# Patient Record
Sex: FEMALE | Race: WHITE | NOT HISPANIC OR LATINO | Employment: STUDENT | URBAN - METROPOLITAN AREA
[De-identification: names, ages, dates, MRNs, and addresses within clinical notes are randomized per-mention and may not be internally consistent; named-entity substitution may affect disease eponyms.]

---

## 2021-01-08 ENCOUNTER — APPOINTMENT (OUTPATIENT)
Dept: CT IMAGING | Facility: HOSPITAL | Age: 21
End: 2021-01-08

## 2021-01-08 ENCOUNTER — TRANSCRIBE ORDERS (OUTPATIENT)
Dept: ADMINISTRATIVE | Facility: HOSPITAL | Age: 21
End: 2021-01-08

## 2021-01-08 ENCOUNTER — ANESTHESIA (OUTPATIENT)
Dept: PERIOP | Facility: HOSPITAL | Age: 21
End: 2021-01-08

## 2021-01-08 ENCOUNTER — HOSPITAL ENCOUNTER (OUTPATIENT)
Facility: HOSPITAL | Age: 21
Discharge: HOME | End: 2021-01-09
Attending: EMERGENCY MEDICINE | Admitting: SURGERY

## 2021-01-08 ENCOUNTER — ANESTHESIA EVENT (OUTPATIENT)
Dept: PERIOP | Facility: HOSPITAL | Age: 21
End: 2021-01-08

## 2021-01-08 DIAGNOSIS — K35.80 ACUTE APPENDICITIS: ICD-10-CM

## 2021-01-08 DIAGNOSIS — K35.30 ACUTE APPENDICITIS WITH LOCALIZED PERITONITIS, WITHOUT PERFORATION, ABSCESS, OR GANGRENE: Primary | ICD-10-CM

## 2021-01-08 DIAGNOSIS — R10.31 ABDOMINAL PAIN, RIGHT LOWER QUADRANT: Primary | ICD-10-CM

## 2021-01-08 LAB
ALBUMIN SERPL-MCNC: 4.4 G/DL (ref 3.5–5.2)
ALBUMIN/GLOB SERPL: 1.4 G/DL
ALP SERPL-CCNC: 64 U/L (ref 39–117)
ALT SERPL W P-5'-P-CCNC: 12 U/L (ref 1–33)
ANION GAP SERPL CALCULATED.3IONS-SCNC: 12 MMOL/L (ref 5–15)
AST SERPL-CCNC: 17 U/L (ref 1–32)
B-HCG UR QL: NEGATIVE
BACTERIA UR QL AUTO: ABNORMAL /HPF
BASOPHILS # BLD AUTO: 0.04 10*3/MM3 (ref 0–0.2)
BASOPHILS NFR BLD AUTO: 0.4 % (ref 0–1.5)
BILIRUB SERPL-MCNC: 0.5 MG/DL (ref 0–1.2)
BILIRUB UR QL STRIP: NEGATIVE
BUN SERPL-MCNC: 5 MG/DL (ref 6–20)
BUN/CREAT SERPL: 5.8 (ref 7–25)
CALCIUM SPEC-SCNC: 9.2 MG/DL (ref 8.6–10.5)
CHLORIDE SERPL-SCNC: 100 MMOL/L (ref 98–107)
CLARITY UR: CLEAR
CO2 SERPL-SCNC: 24 MMOL/L (ref 22–29)
COLOR UR: YELLOW
CREAT SERPL-MCNC: 0.86 MG/DL (ref 0.57–1)
DEPRECATED RDW RBC AUTO: 41.1 FL (ref 37–54)
EOSINOPHIL # BLD AUTO: 0.06 10*3/MM3 (ref 0–0.4)
EOSINOPHIL NFR BLD AUTO: 0.5 % (ref 0.3–6.2)
ERYTHROCYTE [DISTWIDTH] IN BLOOD BY AUTOMATED COUNT: 12.5 % (ref 12.3–15.4)
FLUAV RNA RESP QL NAA+PROBE: NOT DETECTED
FLUBV RNA RESP QL NAA+PROBE: NOT DETECTED
GFR SERPL CREATININE-BSD FRML MDRD: 83 ML/MIN/1.73
GLOBULIN UR ELPH-MCNC: 3.2 GM/DL
GLUCOSE SERPL-MCNC: 98 MG/DL (ref 65–99)
GLUCOSE UR STRIP-MCNC: NEGATIVE MG/DL
HCT VFR BLD AUTO: 35.7 % (ref 34–46.6)
HGB BLD-MCNC: 11.8 G/DL (ref 12–15.9)
HGB UR QL STRIP.AUTO: ABNORMAL
HOLD SPECIMEN: NORMAL
HOLD SPECIMEN: NORMAL
HYALINE CASTS UR QL AUTO: ABNORMAL /LPF
IMM GRANULOCYTES # BLD AUTO: 0.03 10*3/MM3 (ref 0–0.05)
IMM GRANULOCYTES NFR BLD AUTO: 0.3 % (ref 0–0.5)
KETONES UR QL STRIP: ABNORMAL
LEUKOCYTE ESTERASE UR QL STRIP.AUTO: ABNORMAL
LIPASE SERPL-CCNC: 16 U/L (ref 13–60)
LYMPHOCYTES # BLD AUTO: 1.65 10*3/MM3 (ref 0.7–3.1)
LYMPHOCYTES NFR BLD AUTO: 14.7 % (ref 19.6–45.3)
MCH RBC QN AUTO: 30.2 PG (ref 26.6–33)
MCHC RBC AUTO-ENTMCNC: 33.1 G/DL (ref 31.5–35.7)
MCV RBC AUTO: 91.3 FL (ref 79–97)
MONOCYTES # BLD AUTO: 1.1 10*3/MM3 (ref 0.1–0.9)
MONOCYTES NFR BLD AUTO: 9.8 % (ref 5–12)
NEUTROPHILS NFR BLD AUTO: 74.3 % (ref 42.7–76)
NEUTROPHILS NFR BLD AUTO: 8.34 10*3/MM3 (ref 1.7–7)
NITRITE UR QL STRIP: NEGATIVE
NRBC BLD AUTO-RTO: 0 /100 WBC (ref 0–0.2)
PH UR STRIP.AUTO: 6 [PH] (ref 5–8)
PLATELET # BLD AUTO: 244 10*3/MM3 (ref 140–450)
PMV BLD AUTO: 10.6 FL (ref 6–12)
POTASSIUM SERPL-SCNC: 3.9 MMOL/L (ref 3.5–5.2)
PROT SERPL-MCNC: 7.6 G/DL (ref 6–8.5)
PROT UR QL STRIP: NEGATIVE
RBC # BLD AUTO: 3.91 10*6/MM3 (ref 3.77–5.28)
RBC # UR: ABNORMAL /HPF
REF LAB TEST METHOD: ABNORMAL
SARS-COV-2 RNA RESP QL NAA+PROBE: NOT DETECTED
SODIUM SERPL-SCNC: 136 MMOL/L (ref 136–145)
SP GR UR STRIP: 1.02 (ref 1–1.03)
SQUAMOUS #/AREA URNS HPF: ABNORMAL /HPF
UROBILINOGEN UR QL STRIP: ABNORMAL
WBC # BLD AUTO: 11.22 10*3/MM3 (ref 3.4–10.8)
WBC UR QL AUTO: ABNORMAL /HPF
WHOLE BLOOD HOLD SPECIMEN: NORMAL
WHOLE BLOOD HOLD SPECIMEN: NORMAL

## 2021-01-08 PROCEDURE — 85025 COMPLETE CBC W/AUTO DIFF WBC: CPT

## 2021-01-08 PROCEDURE — 96375 TX/PRO/DX INJ NEW DRUG ADDON: CPT

## 2021-01-08 PROCEDURE — 83690 ASSAY OF LIPASE: CPT

## 2021-01-08 PROCEDURE — 99283 EMERGENCY DEPT VISIT LOW MDM: CPT

## 2021-01-08 PROCEDURE — 88304 TISSUE EXAM BY PATHOLOGIST: CPT | Performed by: SURGERY

## 2021-01-08 PROCEDURE — 25010000002 IOPAMIDOL 61 % SOLUTION: Performed by: EMERGENCY MEDICINE

## 2021-01-08 PROCEDURE — 87636 SARSCOV2 & INF A&B AMP PRB: CPT | Performed by: PHYSICIAN ASSISTANT

## 2021-01-08 PROCEDURE — 74177 CT ABD & PELVIS W/CONTRAST: CPT

## 2021-01-08 PROCEDURE — 81001 URINALYSIS AUTO W/SCOPE: CPT

## 2021-01-08 PROCEDURE — 25010000002 NEOSTIGMINE 10 MG/10ML SOLUTION: Performed by: ANESTHESIOLOGY

## 2021-01-08 PROCEDURE — 25010000002 FENTANYL CITRATE (PF) 100 MCG/2ML SOLUTION: Performed by: ANESTHESIOLOGY

## 2021-01-08 PROCEDURE — G0378 HOSPITAL OBSERVATION PER HR: HCPCS

## 2021-01-08 PROCEDURE — 25010000002 KETOROLAC TROMETHAMINE PER 15 MG: Performed by: PHYSICIAN ASSISTANT

## 2021-01-08 PROCEDURE — 25010000002 DEXAMETHASONE PER 1 MG: Performed by: ANESTHESIOLOGY

## 2021-01-08 PROCEDURE — 25010000002 PIPERACILLIN SOD-TAZOBACTAM PER 1 G: Performed by: PHYSICIAN ASSISTANT

## 2021-01-08 PROCEDURE — 80053 COMPREHEN METABOLIC PANEL: CPT

## 2021-01-08 PROCEDURE — 96365 THER/PROPH/DIAG IV INF INIT: CPT

## 2021-01-08 PROCEDURE — 25010000002 PROPOFOL 10 MG/ML EMULSION: Performed by: ANESTHESIOLOGY

## 2021-01-08 PROCEDURE — 81025 URINE PREGNANCY TEST: CPT | Performed by: EMERGENCY MEDICINE

## 2021-01-08 PROCEDURE — 25010000002 ONDANSETRON PER 1 MG: Performed by: ANESTHESIOLOGY

## 2021-01-08 PROCEDURE — 99284 EMERGENCY DEPT VISIT MOD MDM: CPT

## 2021-01-08 PROCEDURE — 25010000002 ONDANSETRON PER 1 MG: Performed by: PHYSICIAN ASSISTANT

## 2021-01-08 PROCEDURE — C9803 HOPD COVID-19 SPEC COLLECT: HCPCS

## 2021-01-08 DEVICE — ENDOPATH ECHELON VASCULAR  RELOADS, WHITE, 35MM
Type: IMPLANTABLE DEVICE | Site: CECUM | Status: FUNCTIONAL
Brand: ECHELON ENDOPATH

## 2021-01-08 DEVICE — LIGACLIP 10-M/L, 10MM ENDOSCOPIC ROTATING MULTIPLE CLIP APPLIERS
Type: IMPLANTABLE DEVICE | Site: CECUM | Status: FUNCTIONAL
Brand: LIGACLIP

## 2021-01-08 RX ORDER — SODIUM CHLORIDE, SODIUM LACTATE, POTASSIUM CHLORIDE, CALCIUM CHLORIDE 600; 310; 30; 20 MG/100ML; MG/100ML; MG/100ML; MG/100ML
INJECTION, SOLUTION INTRAVENOUS CONTINUOUS PRN
Status: DISCONTINUED | OUTPATIENT
Start: 2021-01-08 | End: 2021-01-09 | Stop reason: SURG

## 2021-01-08 RX ORDER — ESCITALOPRAM OXALATE 20 MG/1
20 TABLET ORAL DAILY
COMMUNITY

## 2021-01-08 RX ORDER — LIDOCAINE HYDROCHLORIDE 20 MG/ML
INJECTION, SOLUTION INFILTRATION; PERINEURAL AS NEEDED
Status: DISCONTINUED | OUTPATIENT
Start: 2021-01-08 | End: 2021-01-09 | Stop reason: SURG

## 2021-01-08 RX ORDER — DROPERIDOL 2.5 MG/ML
0.62 INJECTION, SOLUTION INTRAMUSCULAR; INTRAVENOUS ONCE AS NEEDED
Status: DISCONTINUED | OUTPATIENT
Start: 2021-01-08 | End: 2021-01-09 | Stop reason: HOSPADM

## 2021-01-08 RX ORDER — PROPOFOL 10 MG/ML
VIAL (ML) INTRAVENOUS AS NEEDED
Status: DISCONTINUED | OUTPATIENT
Start: 2021-01-08 | End: 2021-01-09 | Stop reason: SURG

## 2021-01-08 RX ORDER — FENTANYL CITRATE 50 UG/ML
INJECTION, SOLUTION INTRAMUSCULAR; INTRAVENOUS AS NEEDED
Status: DISCONTINUED | OUTPATIENT
Start: 2021-01-08 | End: 2021-01-09 | Stop reason: SURG

## 2021-01-08 RX ORDER — TRAZODONE HYDROCHLORIDE 50 MG/1
50 TABLET ORAL NIGHTLY
COMMUNITY

## 2021-01-08 RX ORDER — BUPIVACAINE HYDROCHLORIDE 2.5 MG/ML
INJECTION, SOLUTION EPIDURAL; INFILTRATION; INTRACAUDAL AS NEEDED
Status: DISCONTINUED | OUTPATIENT
Start: 2021-01-08 | End: 2021-01-09 | Stop reason: HOSPADM

## 2021-01-08 RX ORDER — NEOSTIGMINE METHYLSULFATE 1 MG/ML
INJECTION, SOLUTION INTRAVENOUS AS NEEDED
Status: DISCONTINUED | OUTPATIENT
Start: 2021-01-08 | End: 2021-01-09 | Stop reason: SURG

## 2021-01-08 RX ORDER — FENTANYL CITRATE 50 UG/ML
50 INJECTION, SOLUTION INTRAMUSCULAR; INTRAVENOUS
Status: DISCONTINUED | OUTPATIENT
Start: 2021-01-08 | End: 2021-01-09 | Stop reason: HOSPADM

## 2021-01-08 RX ORDER — GLYCOPYRROLATE 0.2 MG/ML
INJECTION INTRAMUSCULAR; INTRAVENOUS AS NEEDED
Status: DISCONTINUED | OUTPATIENT
Start: 2021-01-08 | End: 2021-01-09 | Stop reason: SURG

## 2021-01-08 RX ORDER — ONDANSETRON 2 MG/ML
4 INJECTION INTRAMUSCULAR; INTRAVENOUS ONCE AS NEEDED
Status: COMPLETED | OUTPATIENT
Start: 2021-01-08 | End: 2021-01-09

## 2021-01-08 RX ORDER — DEXAMETHASONE SODIUM PHOSPHATE 10 MG/ML
INJECTION INTRAMUSCULAR; INTRAVENOUS AS NEEDED
Status: DISCONTINUED | OUTPATIENT
Start: 2021-01-08 | End: 2021-01-09 | Stop reason: SURG

## 2021-01-08 RX ORDER — ONDANSETRON 2 MG/ML
4 INJECTION INTRAMUSCULAR; INTRAVENOUS ONCE
Status: COMPLETED | OUTPATIENT
Start: 2021-01-08 | End: 2021-01-08

## 2021-01-08 RX ORDER — ROCURONIUM BROMIDE 10 MG/ML
INJECTION, SOLUTION INTRAVENOUS AS NEEDED
Status: DISCONTINUED | OUTPATIENT
Start: 2021-01-08 | End: 2021-01-09 | Stop reason: SURG

## 2021-01-08 RX ORDER — KETOROLAC TROMETHAMINE 15 MG/ML
15 INJECTION, SOLUTION INTRAMUSCULAR; INTRAVENOUS ONCE
Status: COMPLETED | OUTPATIENT
Start: 2021-01-08 | End: 2021-01-08

## 2021-01-08 RX ORDER — SODIUM CHLORIDE 9 MG/ML
10 INJECTION INTRAVENOUS AS NEEDED
Status: DISCONTINUED | OUTPATIENT
Start: 2021-01-08 | End: 2021-01-09

## 2021-01-08 RX ORDER — MAGNESIUM HYDROXIDE 1200 MG/15ML
LIQUID ORAL AS NEEDED
Status: DISCONTINUED | OUTPATIENT
Start: 2021-01-08 | End: 2021-01-09 | Stop reason: HOSPADM

## 2021-01-08 RX ORDER — ONDANSETRON 2 MG/ML
INJECTION INTRAMUSCULAR; INTRAVENOUS AS NEEDED
Status: DISCONTINUED | OUTPATIENT
Start: 2021-01-08 | End: 2021-01-09 | Stop reason: SURG

## 2021-01-08 RX ORDER — SODIUM CHLORIDE 9 MG/ML
INJECTION, SOLUTION INTRAVENOUS AS NEEDED
Status: DISCONTINUED | OUTPATIENT
Start: 2021-01-08 | End: 2021-01-09 | Stop reason: HOSPADM

## 2021-01-08 RX ADMIN — KETOROLAC TROMETHAMINE 15 MG: 15 INJECTION, SOLUTION INTRAMUSCULAR; INTRAVENOUS at 20:46

## 2021-01-08 RX ADMIN — NEOSTIGMINE 2 MG: 1 INJECTION INTRAVENOUS at 23:50

## 2021-01-08 RX ADMIN — FAMOTIDINE 20 MG: 10 INJECTION, SOLUTION INTRAVENOUS at 22:38

## 2021-01-08 RX ADMIN — DEXAMETHASONE SODIUM PHOSPHATE 8 MG: 10 INJECTION INTRAMUSCULAR; INTRAVENOUS at 23:04

## 2021-01-08 RX ADMIN — PROPOFOL 200 MG: 10 INJECTION, EMULSION INTRAVENOUS at 22:52

## 2021-01-08 RX ADMIN — IOPAMIDOL 80 ML: 612 INJECTION, SOLUTION INTRAVENOUS at 21:20

## 2021-01-08 RX ADMIN — TAZOBACTAM SODIUM AND PIPERACILLIN SODIUM 3.38 G: 375; 3 INJECTION, SOLUTION INTRAVENOUS at 21:47

## 2021-01-08 RX ADMIN — LIDOCAINE HYDROCHLORIDE 100 MG: 20 INJECTION, SOLUTION INFILTRATION; PERINEURAL at 22:52

## 2021-01-08 RX ADMIN — SODIUM CHLORIDE, POTASSIUM CHLORIDE, SODIUM LACTATE AND CALCIUM CHLORIDE: 600; 310; 30; 20 INJECTION, SOLUTION INTRAVENOUS at 22:52

## 2021-01-08 RX ADMIN — SODIUM CHLORIDE 1000 ML: 9 INJECTION, SOLUTION INTRAVENOUS at 20:43

## 2021-01-08 RX ADMIN — ONDANSETRON 4 MG: 2 INJECTION INTRAMUSCULAR; INTRAVENOUS at 23:50

## 2021-01-08 RX ADMIN — ROCURONIUM BROMIDE 35 MG: 10 INJECTION INTRAVENOUS at 22:52

## 2021-01-08 RX ADMIN — FENTANYL CITRATE 50 MCG: 50 INJECTION, SOLUTION INTRAMUSCULAR; INTRAVENOUS at 23:52

## 2021-01-08 RX ADMIN — GLYCOPYRROLATE 0.4 MG: 0.4 INJECTION INTRAMUSCULAR; INTRAVENOUS at 23:50

## 2021-01-08 RX ADMIN — FENTANYL CITRATE 50 MCG: 50 INJECTION, SOLUTION INTRAMUSCULAR; INTRAVENOUS at 22:52

## 2021-01-08 RX ADMIN — ONDANSETRON 4 MG: 2 INJECTION INTRAMUSCULAR; INTRAVENOUS at 20:42

## 2021-01-09 VITALS
OXYGEN SATURATION: 98 % | BODY MASS INDEX: 27.28 KG/M2 | RESPIRATION RATE: 18 BRPM | HEART RATE: 63 BPM | TEMPERATURE: 98.2 F | DIASTOLIC BLOOD PRESSURE: 58 MMHG | HEIGHT: 68 IN | WEIGHT: 180 LBS | SYSTOLIC BLOOD PRESSURE: 108 MMHG

## 2021-01-09 LAB
ANION GAP SERPL CALCULATED.3IONS-SCNC: 9 MMOL/L (ref 5–15)
BASOPHILS # BLD AUTO: 0.01 10*3/MM3 (ref 0–0.2)
BASOPHILS NFR BLD AUTO: 0.1 % (ref 0–1.5)
BUN SERPL-MCNC: 4 MG/DL (ref 6–20)
BUN/CREAT SERPL: 6 (ref 7–25)
CALCIUM SPEC-SCNC: 8.7 MG/DL (ref 8.6–10.5)
CHLORIDE SERPL-SCNC: 106 MMOL/L (ref 98–107)
CO2 SERPL-SCNC: 21 MMOL/L (ref 22–29)
CREAT SERPL-MCNC: 0.67 MG/DL (ref 0.57–1)
DEPRECATED RDW RBC AUTO: 41.9 FL (ref 37–54)
EOSINOPHIL # BLD AUTO: 0 10*3/MM3 (ref 0–0.4)
EOSINOPHIL NFR BLD AUTO: 0 % (ref 0.3–6.2)
ERYTHROCYTE [DISTWIDTH] IN BLOOD BY AUTOMATED COUNT: 12.7 % (ref 12.3–15.4)
GFR SERPL CREATININE-BSD FRML MDRD: 110 ML/MIN/1.73
GLUCOSE SERPL-MCNC: 122 MG/DL (ref 65–99)
HCT VFR BLD AUTO: 31.7 % (ref 34–46.6)
HGB BLD-MCNC: 9.9 G/DL (ref 12–15.9)
IMM GRANULOCYTES # BLD AUTO: 0.02 10*3/MM3 (ref 0–0.05)
IMM GRANULOCYTES NFR BLD AUTO: 0.3 % (ref 0–0.5)
LYMPHOCYTES # BLD AUTO: 0.78 10*3/MM3 (ref 0.7–3.1)
LYMPHOCYTES NFR BLD AUTO: 11.4 % (ref 19.6–45.3)
MCH RBC QN AUTO: 28.7 PG (ref 26.6–33)
MCHC RBC AUTO-ENTMCNC: 31.2 G/DL (ref 31.5–35.7)
MCV RBC AUTO: 91.9 FL (ref 79–97)
MONOCYTES # BLD AUTO: 0.63 10*3/MM3 (ref 0.1–0.9)
MONOCYTES NFR BLD AUTO: 9.2 % (ref 5–12)
NEUTROPHILS NFR BLD AUTO: 5.42 10*3/MM3 (ref 1.7–7)
NEUTROPHILS NFR BLD AUTO: 79 % (ref 42.7–76)
NRBC BLD AUTO-RTO: 0 /100 WBC (ref 0–0.2)
PLAT MORPH BLD: NORMAL
PLATELET # BLD AUTO: 218 10*3/MM3 (ref 140–450)
PMV BLD AUTO: 11 FL (ref 6–12)
POTASSIUM SERPL-SCNC: 4.4 MMOL/L (ref 3.5–5.2)
RBC # BLD AUTO: 3.45 10*6/MM3 (ref 3.77–5.28)
RBC MORPH BLD: NORMAL
SODIUM SERPL-SCNC: 136 MMOL/L (ref 136–145)
WBC # BLD AUTO: 6.86 10*3/MM3 (ref 3.4–10.8)
WBC MORPH BLD: NORMAL

## 2021-01-09 PROCEDURE — 80048 BASIC METABOLIC PNL TOTAL CA: CPT | Performed by: SURGERY

## 2021-01-09 PROCEDURE — 25010000002 FENTANYL CITRATE (PF) 100 MCG/2ML SOLUTION: Performed by: ANESTHESIOLOGY

## 2021-01-09 PROCEDURE — G0378 HOSPITAL OBSERVATION PER HR: HCPCS

## 2021-01-09 PROCEDURE — 85025 COMPLETE CBC W/AUTO DIFF WBC: CPT | Performed by: SURGERY

## 2021-01-09 PROCEDURE — 25010000002 ONDANSETRON PER 1 MG: Performed by: ANESTHESIOLOGY

## 2021-01-09 PROCEDURE — 25010000002 ONDANSETRON PER 1 MG: Performed by: SURGERY

## 2021-01-09 PROCEDURE — 85007 BL SMEAR W/DIFF WBC COUNT: CPT | Performed by: SURGERY

## 2021-01-09 PROCEDURE — 25010000002 PIPERACILLIN SOD-TAZOBACTAM PER 1 G: Performed by: SURGERY

## 2021-01-09 RX ORDER — DEXTROSE AND SODIUM CHLORIDE 5; .45 G/100ML; G/100ML
100 INJECTION, SOLUTION INTRAVENOUS CONTINUOUS
Status: DISCONTINUED | OUTPATIENT
Start: 2021-01-09 | End: 2021-01-09

## 2021-01-09 RX ORDER — FAMOTIDINE 20 MG/1
20 TABLET, FILM COATED ORAL 2 TIMES DAILY
Status: DISCONTINUED | OUTPATIENT
Start: 2021-01-09 | End: 2021-01-09 | Stop reason: HOSPADM

## 2021-01-09 RX ORDER — ONDANSETRON 2 MG/ML
4 INJECTION INTRAMUSCULAR; INTRAVENOUS EVERY 6 HOURS PRN
Status: DISCONTINUED | OUTPATIENT
Start: 2021-01-09 | End: 2021-01-09 | Stop reason: HOSPADM

## 2021-01-09 RX ORDER — ONDANSETRON 4 MG/1
4 TABLET, FILM COATED ORAL EVERY 6 HOURS PRN
Status: DISCONTINUED | OUTPATIENT
Start: 2021-01-09 | End: 2021-01-09 | Stop reason: HOSPADM

## 2021-01-09 RX ORDER — OXYCODONE HYDROCHLORIDE AND ACETAMINOPHEN 5; 325 MG/1; MG/1
1 TABLET ORAL EVERY 4 HOURS PRN
Status: DISCONTINUED | OUTPATIENT
Start: 2021-01-09 | End: 2021-01-09 | Stop reason: HOSPADM

## 2021-01-09 RX ORDER — DOCUSATE SODIUM 100 MG/1
100 CAPSULE, LIQUID FILLED ORAL 2 TIMES DAILY PRN
Status: DISCONTINUED | OUTPATIENT
Start: 2021-01-09 | End: 2021-01-09 | Stop reason: HOSPADM

## 2021-01-09 RX ORDER — ESCITALOPRAM OXALATE 20 MG/1
20 TABLET ORAL DAILY
Status: DISCONTINUED | OUTPATIENT
Start: 2021-01-09 | End: 2021-01-09 | Stop reason: HOSPADM

## 2021-01-09 RX ORDER — PSEUDOEPHEDRINE HCL 30 MG
100 TABLET ORAL 2 TIMES DAILY PRN
Qty: 30 CAPSULE | Refills: 1 | Status: SHIPPED | OUTPATIENT
Start: 2021-01-09

## 2021-01-09 RX ORDER — NALOXONE HCL 0.4 MG/ML
0.1 VIAL (ML) INJECTION
Status: DISCONTINUED | OUTPATIENT
Start: 2021-01-09 | End: 2021-01-09 | Stop reason: HOSPADM

## 2021-01-09 RX ORDER — HYDROMORPHONE HCL 110MG/55ML
0.5 PATIENT CONTROLLED ANALGESIA SYRINGE INTRAVENOUS
Status: DISCONTINUED | OUTPATIENT
Start: 2021-01-09 | End: 2021-01-09 | Stop reason: HOSPADM

## 2021-01-09 RX ORDER — OXYCODONE HYDROCHLORIDE AND ACETAMINOPHEN 5; 325 MG/1; MG/1
1 TABLET ORAL EVERY 4 HOURS PRN
Qty: 30 TABLET | Refills: 0 | Status: SHIPPED | OUTPATIENT
Start: 2021-01-09 | End: 2021-01-19

## 2021-01-09 RX ORDER — TRAZODONE HYDROCHLORIDE 50 MG/1
50 TABLET ORAL NIGHTLY
Status: DISCONTINUED | OUTPATIENT
Start: 2021-01-09 | End: 2021-01-09 | Stop reason: HOSPADM

## 2021-01-09 RX ADMIN — FAMOTIDINE 20 MG: 20 TABLET, FILM COATED ORAL at 08:22

## 2021-01-09 RX ADMIN — OXYCODONE HYDROCHLORIDE AND ACETAMINOPHEN 1 TABLET: 5; 325 TABLET ORAL at 09:09

## 2021-01-09 RX ADMIN — OXYCODONE HYDROCHLORIDE AND ACETAMINOPHEN 1 TABLET: 5; 325 TABLET ORAL at 14:34

## 2021-01-09 RX ADMIN — ONDANSETRON 4 MG: 2 INJECTION INTRAMUSCULAR; INTRAVENOUS at 01:12

## 2021-01-09 RX ADMIN — FAMOTIDINE 20 MG: 20 TABLET, FILM COATED ORAL at 01:05

## 2021-01-09 RX ADMIN — DEXTROSE AND SODIUM CHLORIDE 100 ML/HR: 5; 450 INJECTION, SOLUTION INTRAVENOUS at 01:05

## 2021-01-09 RX ADMIN — TAZOBACTAM SODIUM AND PIPERACILLIN SODIUM 3.38 G: 375; 3 INJECTION, SOLUTION INTRAVENOUS at 05:19

## 2021-01-09 RX ADMIN — TRAZODONE HYDROCHLORIDE 50 MG: 50 TABLET ORAL at 01:05

## 2021-01-09 RX ADMIN — OXYCODONE HYDROCHLORIDE AND ACETAMINOPHEN 1 TABLET: 5; 325 TABLET ORAL at 02:57

## 2021-01-09 RX ADMIN — FENTANYL CITRATE 50 MCG: 0.05 INJECTION, SOLUTION INTRAMUSCULAR; INTRAVENOUS at 00:15

## 2021-01-09 RX ADMIN — ESCITALOPRAM OXALATE 20 MG: 20 TABLET ORAL at 08:22

## 2021-01-09 RX ADMIN — ONDANSETRON 4 MG: 2 INJECTION INTRAMUSCULAR; INTRAVENOUS at 00:15

## 2021-01-09 NOTE — ANESTHESIA PROCEDURE NOTES
Airway  Urgency: elective    Date/Time: 1/8/2021 10:54 PM  Airway not difficult    General Information and Staff    Patient location during procedure: OR    Indications and Patient Condition  Indications for airway management: airway protection    Preoxygenated: yes  MILS not maintained throughout  Mask difficulty assessment: 1 - vent by mask    Final Airway Details  Final airway type: endotracheal airway      Successful airway: ETT  Cuffed: yes   Successful intubation technique: direct laryngoscopy  Endotracheal tube insertion site: oral  Blade: Gustavo  Blade size: 3  ETT size (mm): 7.0  Cormack-Lehane Classification: grade I - full view of glottis  Placement verified by: chest auscultation and capnometry   Measured from: lips  ETT/EBT  to lips (cm): 20  Number of attempts at approach: 1  Assessment: lips, teeth, and gum same as pre-op and atraumatic intubation    Additional Comments  Negative epigastric sounds, Breath sound equal bilaterally with symmetric chest rise and fall

## 2021-01-09 NOTE — ED PROVIDER NOTES
" EMERGENCY DEPARTMENT ENCOUNTER    Room Number:  01/01  Date of encounter:  1/8/2021  PCP: Valeria Patel APRN  Historian: Patient      HPI:  Chief Complaint: Right lower quadrant pain since last night nausea vomiting and diarrhea        Context: Alethea Mandujano is a 20 y.o. female who presents to the ED c/o onset yesterday evening of right sided lower quadrant abdominal pain she describes as her stomach being \"on fire\".  This has been accompanied by persistent nausea, vomiting x3, and diarrhea x3 today.  She describes generalized myalgias and arthralgias and general malaise and fatigue.  Pain today has settled in her right lower quadrant.  Last meal was a few crackers this morning.  No stiff neck vision changes or photophobia.  No headache vision or vision changes.  She denies cough or coryza symptoms.  No shortness of breath back pain or hemoptysis.  No hematochezia or melena.  No dysuria hematuria pyuria.  No flank pain.  No vaginal discharge or bleeding.  Last menstrual period was 12/23, denies possibility of pregnancy.      PAST MEDICAL HISTORY  Active Ambulatory Problems     Diagnosis Date Noted   • No Active Ambulatory Problems     Resolved Ambulatory Problems     Diagnosis Date Noted   • No Resolved Ambulatory Problems     No Additional Past Medical History         PAST SURGICAL HISTORY  Past Surgical History:   Procedure Laterality Date   • WISDOM TOOTH EXTRACTION           FAMILY HISTORY  History reviewed. No pertinent family history.      SOCIAL HISTORY  Social History     Socioeconomic History   • Marital status: Single     Spouse name: Not on file   • Number of children: Not on file   • Years of education: Not on file   • Highest education level: Not on file   Tobacco Use   • Smoking status: Never Smoker   Substance and Sexual Activity   • Alcohol use: Not Currently   • Drug use: Never         ALLERGIES  Patient has no known allergies.        REVIEW OF SYSTEMS  Review of Systems   Constitutional: " Positive for activity change, appetite change, chills and fatigue.   HENT: Negative for congestion, rhinorrhea and sore throat.    Respiratory: Negative for cough, chest tightness and shortness of breath.    Cardiovascular: Negative for chest pain, palpitations and leg swelling.   Gastrointestinal: Positive for abdominal pain and diarrhea. Negative for nausea and vomiting.   Genitourinary: Negative for dysuria, flank pain, hematuria, urgency, vaginal bleeding and vaginal discharge.   Musculoskeletal: Positive for arthralgias and myalgias. Negative for back pain, neck pain and neck stiffness.   Neurological: Negative for seizures, syncope and headaches.        All systems reviewed and negative except for those discussed in HPI.       PHYSICAL EXAM    I have reviewed the triage vital signs and nursing notes.    ED Triage Vitals [01/08/21 1718]   Temp Heart Rate Resp BP SpO2   97.3 °F (36.3 °C) 98 20 133/99 100 %      Temp src Heart Rate Source Patient Position BP Location FiO2 (%)   Tympanic Monitor Sitting Left arm --       Physical Exam  GENERAL:   Hemodynamically stable and afebrile, not toxic appearing, not in acute distress.  HENT: Nares patent.  EYES: No scleral icterus.  CV: Regular rhythm, regular rate.  RESPIRATORY: Normal effort.  No audible wheezes, rales or rhonchi.  ABDOMEN: Soft, with moderate tenderness at McBurney's point, reproducing patient's symptoms.  She does have mild rebound, and Rovsing's is ambiguous.  Bowel sounds are normal.  No CVA or suprapubic tenderness.  MUSCULOSKELETAL: No deformities.   NEURO: Alert, moves all extremities, follows commands.  SKIN: Warm, dry, no rash visualized.        LAB RESULTS  Recent Results (from the past 24 hour(s))   Light Blue Top    Collection Time: 01/08/21  5:29 PM   Result Value Ref Range    Extra Tube hold for add-on    Comprehensive Metabolic Panel    Collection Time: 01/08/21  5:30 PM    Specimen: Blood   Result Value Ref Range    Glucose 98 65 - 99  mg/dL    BUN 5 (L) 6 - 20 mg/dL    Creatinine 0.86 0.57 - 1.00 mg/dL    Sodium 136 136 - 145 mmol/L    Potassium 3.9 3.5 - 5.2 mmol/L    Chloride 100 98 - 107 mmol/L    CO2 24.0 22.0 - 29.0 mmol/L    Calcium 9.2 8.6 - 10.5 mg/dL    Total Protein 7.6 6.0 - 8.5 g/dL    Albumin 4.40 3.50 - 5.20 g/dL    ALT (SGPT) 12 1 - 33 U/L    AST (SGOT) 17 1 - 32 U/L    Alkaline Phosphatase 64 39 - 117 U/L    Total Bilirubin 0.5 0.0 - 1.2 mg/dL    eGFR Non African Amer 83 >60 mL/min/1.73    Globulin 3.2 gm/dL    A/G Ratio 1.4 g/dL    BUN/Creatinine Ratio 5.8 (L) 7.0 - 25.0    Anion Gap 12.0 5.0 - 15.0 mmol/L   Lipase    Collection Time: 01/08/21  5:30 PM    Specimen: Blood   Result Value Ref Range    Lipase 16 13 - 60 U/L   Green Top (Gel)    Collection Time: 01/08/21  5:30 PM   Result Value Ref Range    Extra Tube Hold for add-ons.    Lavender Top    Collection Time: 01/08/21  5:30 PM   Result Value Ref Range    Extra Tube hold for add-on    Gold Top - SST    Collection Time: 01/08/21  5:30 PM   Result Value Ref Range    Extra Tube Hold for add-ons.    CBC Auto Differential    Collection Time: 01/08/21  5:30 PM    Specimen: Blood   Result Value Ref Range    WBC 11.22 (H) 3.40 - 10.80 10*3/mm3    RBC 3.91 3.77 - 5.28 10*6/mm3    Hemoglobin 11.8 (L) 12.0 - 15.9 g/dL    Hematocrit 35.7 34.0 - 46.6 %    MCV 91.3 79.0 - 97.0 fL    MCH 30.2 26.6 - 33.0 pg    MCHC 33.1 31.5 - 35.7 g/dL    RDW 12.5 12.3 - 15.4 %    RDW-SD 41.1 37.0 - 54.0 fl    MPV 10.6 6.0 - 12.0 fL    Platelets 244 140 - 450 10*3/mm3    Neutrophil % 74.3 42.7 - 76.0 %    Lymphocyte % 14.7 (L) 19.6 - 45.3 %    Monocyte % 9.8 5.0 - 12.0 %    Eosinophil % 0.5 0.3 - 6.2 %    Basophil % 0.4 0.0 - 1.5 %    Immature Grans % 0.3 0.0 - 0.5 %    Neutrophils, Absolute 8.34 (H) 1.70 - 7.00 10*3/mm3    Lymphocytes, Absolute 1.65 0.70 - 3.10 10*3/mm3    Monocytes, Absolute 1.10 (H) 0.10 - 0.90 10*3/mm3    Eosinophils, Absolute 0.06 0.00 - 0.40 10*3/mm3    Basophils, Absolute 0.04  0.00 - 0.20 10*3/mm3    Immature Grans, Absolute 0.03 0.00 - 0.05 10*3/mm3    nRBC 0.0 0.0 - 0.2 /100 WBC   Urinalysis With Microscopic If Indicated (No Culture) - Urine, Clean Catch    Collection Time: 01/08/21  5:33 PM    Specimen: Urine, Clean Catch   Result Value Ref Range    Color, UA Yellow Yellow, Straw    Appearance, UA Clear Clear    pH, UA 6.0 5.0 - 8.0    Specific Gravity, UA 1.016 1.001 - 1.030    Glucose, UA Negative Negative    Ketones, UA 15 mg/dL (1+) (A) Negative    Bilirubin, UA Negative Negative    Blood, UA Trace (A) Negative    Protein, UA Negative Negative    Leuk Esterase, UA Trace (A) Negative    Nitrite, UA Negative Negative    Urobilinogen, UA 1.0 E.U./dL 0.2 - 1.0 E.U./dL   Urinalysis, Microscopic Only - Urine, Clean Catch    Collection Time: 01/08/21  5:33 PM    Specimen: Urine, Clean Catch   Result Value Ref Range    RBC, UA 3-6 (A) None Seen, 0-2 /HPF    WBC, UA 3-5 (A) None Seen, 0-2 /HPF    Bacteria, UA None Seen None Seen, Trace /HPF    Squamous Epithelial Cells, UA 3-6 (A) None Seen, 0-2 /HPF    Hyaline Casts, UA 0-6 0 - 6 /LPF    Methodology Automated Microscopy    Pregnancy, Urine - Urine, Clean Catch    Collection Time: 01/08/21  5:33 PM    Specimen: Urine, Clean Catch   Result Value Ref Range    HCG, Urine QL Negative Negative   COVID-19 and FLU A/B PCR - Swab, Nasopharynx    Collection Time: 01/08/21  8:52 PM    Specimen: Nasopharynx; Swab   Result Value Ref Range    COVID19 Not Detected Not Detected - Ref. Range    Influenza A PCR Not Detected Not Detected    Influenza B PCR Not Detected Not Detected       If labs were ordered, I independently reviewed the results.        RADIOLOGY  Ct Abdomen Pelvis With Contrast    Result Date: 1/8/2021  CT Abdomen Pelvis W INDICATION: Right lower quadrant pain, nausea vomiting TECHNIQUE: CT of the abdomen and pelvis with IV contrast. Coronal and sagittal reconstructions were obtained.  Radiation dose reduction techniques included automated  exposure control or exposure modulation based on body size. Count of known CT and cardiac nuc med studies performed in previous 12 months: 0. COMPARISON: None available. FINDINGS: Abdomen: Lung bases are clear. Liver, spleen, pancreas and kidneys appear unremarkable. Pelvis: The appendix is dilated in the right lower quadrant with associated fat stranding and fluid. Other visualized bowel is unremarkable. No acute osseous abnormality.     Findings are consistent with acute appendicitis. NOTIFICATION: Critical Value/emergent results were called by telephone at the time of interpretation on 1/8/2021 9:40 PM to Dr. Dawit MD who verbally acknowledged these results. Signer Name: Jennifer Walden MD  Signed: 1/8/2021 9:43 PM  Workstation Name: [id number]  [facility]            PROCEDURES    Procedures    No orders to display       MEDICATIONS GIVEN IN ER    Medications   Sodium Chloride (PF) 0.9 % 10 mL (has no administration in time range)   piperacillin-tazobactam (ZOSYN) 3.375 g in iso-osmotic dextrose 50 ml (premix) (3.375 g Intravenous New Bag 1/8/21 2147)   sodium chloride 0.9 % bolus 1,000 mL (0 mL Intravenous Stopped 1/8/21 2133)   ondansetron (ZOFRAN) injection 4 mg (4 mg Intravenous Given 1/8/21 2042)   ketorolac (TORADOL) injection 15 mg (15 mg Intravenous Given 1/8/21 2046)   iopamidol (ISOVUE-300) 61 % injection 100 mL (80 mL Intravenous Given 1/8/21 2120)         PROGRESS, DATA ANALYSIS, CONSULTS, AND MEDICAL DECISION MAKING    All labs have been independently reviewed by me.  All radiology studies have been reviewed by me and the radiologist dictating the report.   EKG's have been independently viewed and interpreted by me.                 CT findings consistent with acute appendicitis.  3.375 g Zosyn IV initiated here in the emergency department.  Discussed with Dr. Triplett, on-call for general surgery, who accepts patient to his service.      AS OF 22:02 EST VITALS:    BP -  128/72  HR - 98  TEMP - 97.3 °F (36.3 °C) (Tympanic)  O2 SATS - 100%        DIAGNOSIS  Final diagnoses:   Acute appendicitis with localized peritonitis, without perforation, abscess, or gangrene         DISPOSITION  Admit to general surgery, Dr. Ioana Singletary, Marco Peres PA-C  01/08/21 5506

## 2021-01-09 NOTE — OP NOTE
Operative Report    Patient Name:  Alethea Mandujano  YOB: 2000  [id number]    1/8/2021      PREOPERATIVE DIAGNOSIS: Acute appendicitis       POSTOPERATIVE DIAGNOSIS: Same        PROCEDURE PERFORMED: Laparoscopic Appendectomy        SURGEON: David Triplett MD      ASSISTANT: None        SPECIMENS: Appendix and contents        ANESTHESIA: General.        FINDINGS:  1. Acutely inflamed appendix without perforation        INDICATIONS:      The patient is a 20 y.o. female with a history of abdominal pain, concerning for acute appendicitis . Pre-operative imaging including CT scan confirmed the diagnosis. The risks and benefits of laparoscopic appendectomy, possible open were discussed with the patient and their family and they agree to proceed.          DESCRIPTION OF PROCEDURE:      After obtaining informed consent, the patient was taken to the operating room and placed in the supine position. After appropriate DVT and antibiotic prophylaxis, general anesthesia was induced. The abdomen was prepped and draped in standard sterile fashion, and after infiltrating the skin with local anesthetic, a 12mm skin incision was made superior to the umbilicus. Blunt dissection was carried down to the base of the umbilicus, which was grasped with a Kocher clamp and elevated anteriorly. A vertical midline incision was made at the base of the umbilicus, and blunt dissection was carried down into the peritoneal cavity. A stay suture of 0 Vicryl was then placed in figure-of-eight fashion around the defect, and a blunt trocar advanced without difficulty into the abdominal cavity. The abdomen was insufflated with carbon dioxide gas to a pressure of 15 mmHg, and a laparoscope advanced through the trocar and the abdominal contents were inspected. There was no evidence of bowel, bladder, or visceral injury with entrance of the trocar. At this point, after infiltrating the skin with local anesthetic, a standard laparoscopic  appendectomy trocar placement schema was followed, with care taken to avoid the bladder.     The right lower quadrant was inspected. There was an acutely inflamed appendix identified. Using meticulous blunt dissection the base of the appendix was circumferentially cleared, and the appendix was divided flush with the cecum using a stapling device, with care taken not to encroach upon the ileocecal valve. An additional firing of the stapler was taken across the appendiceal mesentery, the appendix placed in an Endo Catch bag, and removed through the supraumbilical trocar site. The specimen was examined on the back table, was complete, and passed off as specimen.     The mesenteric and cecal staple lines were inspected, and free of bleeding or leak. The right lower quadrant was irrigated with normal saline until clear, and there was no bleeding seen.  The abdomen was desufflated and reinsufflated several times to make sure the pneumoperitoneum was not tamponading any bleeding, and there was none.      The abdomen was again irrigated with saline until clear, and all trocars removed under direct and laparoscopic visualization. The fascia at the periumbilical incision was closed using the previously placed 0 Vicryl suture. The wounds were irrigated with normal saline, and closed in each area using absorbable subcuticular suture. The incisions were dressed in standard sterile fashion and covered with dry dressings. The patient recovered from anesthesia, was extubated in the operating room, and transferred to the PACU in stable condition.  All sponge and needle counts were correct times two at the completion of the procedure. There were no immediate complications.         David Triplett MD  1/8/2021  23:59 EST

## 2021-01-09 NOTE — NURSING NOTE
Pt presented to ED last night with RLQ pain and N/V/D X3 days. Laparoscopic appendectomy preformed leaving 3 lap sites. VSS with minimal c/o discomfort and one dose PRN Zofran given per nausea. IV fluids and ABX given. Will CTM and provide care.

## 2021-01-09 NOTE — PROGRESS NOTES
"Patient Name:  Alethea Mandujano  YOB: 2000  [id number]    Surgery Progress Note    Date of visit: 1/9/2021    Subjective   Pt without acute events overnight. Some nausea initially, now resolved. Pain well controlled.   No fevers/chills.       Objective       /54 (BP Location: Right arm, Patient Position: Lying)   Pulse 59   Temp 98.1 °F (36.7 °C)   Resp 16   Ht 172.7 cm (68\")   Wt 81.6 kg (180 lb)   LMP 12/23/2020   SpO2 97%   BMI 27.37 kg/m²     Intake/Output Summary (Last 24 hours) at 1/9/2021 0833  Last data filed at 1/9/2021 0700  Gross per 24 hour   Intake 2150 ml   Output 1600 ml   Net 550 ml       CV:  Rhythm regular and rate regular  L:  Clear to auscultation bilaterally  Abd:  Bowel sounds positive, soft, appropriately tender, dressings c/d/i  Ext:  No cyanosis, clubbing, edema    Recent labs and imaging that are back at this time have been reviewed.   AM labs pending       Assessment/Plan     Pt POD#1 lap appy  Doing well  Adv diet, stop IV fluids, continue IV antibiotics  Pain control  Await AM labs  DC planning for this afternoon if tolerates diet and labs OK        David Triplett MD  1/9/2021  08:33 EST      "

## 2021-01-09 NOTE — ANESTHESIA POSTPROCEDURE EVALUATION
Patient: Alethea Mandujano    Procedure Summary     Date: 01/08/21 Room / Location:  [facility] /  [facility]    Anesthesia Start: 2247 Anesthesia Stop: 01/09/21 0005    Procedure: APPENDECTOMY LAPAROSCOPIC POSSIBLE OPEN (N/A Abdomen) Diagnosis:       Acute appendicitis      (Acute appendicitis)    Surgeon: David Triplett MD Provider: Arron Deras MD    Anesthesia Type: general ASA Status: 2 - Emergent          Anesthesia Type: general    Vitals  No vitals data found for the desired time range.          Post Anesthesia Care and Evaluation    Patient location during evaluation: PACU  Patient participation: complete - patient participated  Level of consciousness: awake and alert  Pain score: 3  Pain management: adequate  Airway patency: patent  Anesthetic complications: No anesthetic complications  PONV Status: none  Cardiovascular status: hemodynamically stable and acceptable  Respiratory status: nonlabored ventilation, acceptable and nasal cannula  Hydration status: acceptable

## 2021-01-09 NOTE — H&P
Patient Name:  Alethea Mandujano  YOB: 2000  [id number]       Patient Care Team:  Valeria Patel APRN as PCP - General (Nurse Practitioner)      General Surgery H&P Note       Subjective      Alethea Mandujano is a 20 y.o. female who began having vague abdominal pain on the evening of 1/7/2021.  This pain was associated with nausea and vomiting.  This morning when she woke up the pain had increased and had localized to the right lower quadrant.  The pain continued to worsen throughout the day and the patient presented to the emergency room on the evening of 1/8/2021.  Lab work revealed an elevated white blood cell count and CT scan of the abdomen pelvis revealed an acute appendicitis without perforation.      Currently the patient is experiencing right lower quadrant abdominal pain, without nausea or vomiting currently.  The patient has never experienced any fevers but possibly had some chills at home.    Allergy: No Known Allergies    Medications:        No current facility-administered medications on file prior to encounter.      Current Outpatient Medications on File Prior to Encounter   Medication Sig   • escitalopram (LEXAPRO) 20 MG tablet Take 20 mg by mouth Daily.   • Norgestimate-Eth Estradiol (SPRINTEC 28 PO) Take  by mouth.   • traZODone (DESYREL) 50 MG tablet Take 50 mg by mouth Every Night.       PMHx:   History reviewed. No pertinent past medical history.    Past Surgical History:  Past Surgical History:   Procedure Laterality Date   • WISDOM TOOTH EXTRACTION          Family History:   History reviewed. No pertinent family history.     Social History:   Social History     Socioeconomic History   • Marital status: Single     Spouse name: Not on file   • Number of children: Not on file   • Years of education: Not on file   • Highest education level: Not on file   Tobacco Use   • Smoking status: Never Smoker   Substance and Sexual Activity   • Alcohol use: Not Currently   • Drug use: Never     "     Review of Systems     Constitutional: No fevers, chills or malaise   Eyes: Denies visual changes    Cardiovascular: Denies chest pain, palpitations   Pulmonary: Denies cough or shortness of breath   Abdominal/ GI: See HPI    Genitourinary: Denies dysuria or hematuria   Musculoskeletal: Denies any but chronic joint aches, pains or deformities   Psychiatric: No recent mood changes   Neurologic: No paresthesias or loss of function          Objective     Physical Exam:      Vital Signs  /72   Pulse 103   Temp 97.3 °F (36.3 °C) (Tympanic)   Resp 18   Ht 172.7 cm (68\")   Wt 81.6 kg (180 lb)   LMP 12/23/2020   SpO2 100%   BMI 27.37 kg/m²   No intake or output data in the 24 hours ending 01/08/21 2227      Physical Exam:    Head: Normocephalic, atraumatic.   Eyes: Pupils equal, round, react to light and accommodation.   Mouth: Oral mucosa without lesions  Neck: No masses, lymphadenopathy or carotid bruits bilaterally  CV: Rhythm and rate regular, no murmurs, rubs or gallops  Lungs: Clear to auscultation bilaterally  Abdomen: Bowel sounds positive, soft, tender to palpation in the right lower quadrant with positive rebound and guarding  Groin : No obvious hernias bilaterally  Extremities:  No cyanosis, clubbing or edema bilaterally  Lymphatics: No abnormal lymphadenopathy appreciated  Neurologic: No gross deficits      Results Review: I have personally reviewed all of the recent lab and imaging results available at this time.   WBCs 11.2  CT A/P:  IMPRESSION:  Findings are consistent with acute appendicitis.     Assessment/Plan     Assessment and Plan:    Patient with acute appendicitis.  We will plan for urgent appendectomy this evening.  Continue n.p.o. and IV fluids.  Start IV antibiotics.  Continue pain control.      I discussed the patient's findings and my recommendations with the patient and her mother.    David Triplett MD  01/08/21  22:27 EST      "

## 2021-01-09 NOTE — BRIEF OP NOTE
APPENDECTOMY LAPAROSCOPIC POSSIBLE OPEN  Progress Note    Alethea Delbert  1/8/2021 - 1/9/2021    Pre-op Diagnosis:   Acute appendicitis       Post-Op Diagnosis Codes:     * Acute appendicitis [540]    Procedure/CPT® Codes:        Procedure(s):  APPENDECTOMY LAPAROSCOPIC     Surgeon(s):  David Triplett MD    Anesthesia: General    Staff:   Circulator: Amada Hughes RN  Scrub Person: Francia Butcher; Jeff Cintron  Vendor Representative: Edgar Enrique         Estimated Blood Loss: minimal    Urine Voided: * No values recorded between 1/8/2021 10:48 PM and 1/8/2021 11:56 PM *    Specimens:                Specimens     ID Source Type Tests Collected By Collected At Frozen?      A Large Intestine, Appendix Tissue · TISSUE PATHOLOGY EXAM   David Triplett MD 1/8/21 [id number] No     Description: appendix    This specimen was not marked as sent.                Drains: * No LDAs found *    Findings: acutely inflamed appendix without gross perforation    Complications: none          David Triplett MD     Date: 1/8/2021  Time: 23:58 EST

## 2021-01-10 NOTE — DISCHARGE SUMMARY
Patient Name:  Alethea Mandujano  YOB: 2000  [id number]      Date of Discharge:  1/9/2021    Discharge Diagnosis:   Acute appendicitis    Problem List:  Active Hospital Problems    Diagnosis  POA   • Acute appendicitis with localized peritonitis, without perforation, abscess, or gangrene [K35.30]  Yes      Resolved Hospital Problems   No resolved problems to display.       Presenting Problem/History of Present Illness  Acute appendicitis with localized peritonitis, without perforation, abscess, or gangrene [K35.30]      Hospital Course  Patient is a 20 y.o. female presented with acute onset of abdominal pain and nausea. Work-up was consistent with acute appendicitis. Was taken to the OR on 1/8/2021 for laparoscopic appendectomy. By POD#1, pain was well controlled, she was ambulating and voiding appropriately, and tolerating a diet, and found to be safe for discharge.      Procedures Performed    Procedure(s):  APPENDECTOMY LAPAROSCOPIC POSSIBLE OPEN  -------------------       Consults:   Consults     No orders found for last 30 day(s).          Pertinent Test Results: N/A    Condition on Discharge:  Pain controlled with oral pain medication, tolerating diet, ambulating appropriately      Vital Signs  Temp:  [98 °F (36.7 °C)-99.5 °F (37.5 °C)] 98.2 °F (36.8 °C)  Heart Rate:  [] 63  Resp:  [16-18] 18  BP: (106-135)/(54-79) 108/58    Discharge Disposition  Home or Self Care    Discharge Medications     Discharge Medications      New Medications      Instructions Start Date   docusate sodium 100 MG capsule   100 mg, Oral, 2 Times Daily PRN      oxyCODONE-acetaminophen 5-325 MG per tablet  Commonly known as: PERCOCET   1 tablet, Oral, Every 4 Hours PRN         Continue These Medications      Instructions Start Date   escitalopram 20 MG tablet  Commonly known as: LEXAPRO   20 mg, Oral, Daily      SPRINTEC 28 PO   Oral      traZODone 50 MG tablet  Commonly known as: DESYREL   50 mg, Oral, Nightly              Discharge Diet       Activity at Discharge  Activity Instructions     Discharge Activity      1) No driving until no longer taking narcotics.   2) Return to school / work after seen in Dr. Triplett's clinic.  3) May shower starting 1/10. No tub baths or swimming for 2 weeks.  4) Do not lift / push / pull more than 15 lbs.          Follow-up Appointments  No future appointments.  Additional Instructions for the Follow-ups that You Need to Schedule     Discharge Follow-up with Specified Provider: Dr. Triplett's office; 1 Week   As directed      To: Dr. Triplett's office    Follow Up: 1 Week    Follow Up Details: Call [phone] to make an appointment         Notify Physician or Go To The ED For the Following Conditions   As directed      Fever >100.4, increasing abdominal pain, persistent nausea/vomiting, new redness/drainage from wound    Order Comments: Fever >100.4, increasing abdominal pain, persistent nausea/vomiting, new redness/drainage from wound                Test Results Pending at Discharge  Pending Labs     Order Current Status    Tissue Pathology Exam In process          Time: Discharge 15 min    David Triplett MD   01/09/21   19:38 EST

## 2021-01-11 ENCOUNTER — TRANSCRIBE ORDERS (OUTPATIENT)
Dept: ADMINISTRATIVE | Facility: HOSPITAL | Age: 21
End: 2021-01-11

## 2021-01-11 DIAGNOSIS — R10.31 ABDOMINAL PAIN, RIGHT LOWER QUADRANT: Primary | ICD-10-CM

## 2021-01-11 LAB
CYTO UR: NORMAL
LAB AP CASE REPORT: NORMAL
LAB AP CLINICAL INFORMATION: NORMAL
PATH REPORT.FINAL DX SPEC: NORMAL
PATH REPORT.GROSS SPEC: NORMAL

## 2021-01-13 ENCOUNTER — TRANSCRIBE ORDERS (OUTPATIENT)
Dept: LAB | Facility: HOSPITAL | Age: 21
End: 2021-01-13

## 2021-01-13 ENCOUNTER — LAB (OUTPATIENT)
Dept: LAB | Facility: HOSPITAL | Age: 21
End: 2021-01-13

## 2021-01-13 DIAGNOSIS — D64.9 ANEMIA, UNSPECIFIED TYPE: ICD-10-CM

## 2021-01-13 DIAGNOSIS — D64.9 ANEMIA, UNSPECIFIED TYPE: Primary | ICD-10-CM

## 2021-01-13 LAB
DEPRECATED RDW RBC AUTO: 39.2 FL (ref 37–54)
ERYTHROCYTE [DISTWIDTH] IN BLOOD BY AUTOMATED COUNT: 12.2 % (ref 12.3–15.4)
HCT VFR BLD AUTO: 36.5 % (ref 34–46.6)
HGB BLD-MCNC: 12.4 G/DL (ref 12–15.9)
MCH RBC QN AUTO: 30.3 PG (ref 26.6–33)
MCHC RBC AUTO-ENTMCNC: 34 G/DL (ref 31.5–35.7)
MCV RBC AUTO: 89.2 FL (ref 79–97)
PLATELET # BLD AUTO: 329 10*3/MM3 (ref 140–450)
PMV BLD AUTO: 11.2 FL (ref 6–12)
RBC # BLD AUTO: 4.09 10*6/MM3 (ref 3.77–5.28)
WBC # BLD AUTO: 6.6 10*3/MM3 (ref 3.4–10.8)

## 2021-01-13 PROCEDURE — 36415 COLL VENOUS BLD VENIPUNCTURE: CPT

## 2021-01-13 PROCEDURE — 85027 COMPLETE CBC AUTOMATED: CPT

## (undated) DEVICE — VISUALIZATION SYSTEM: Brand: CLEARIFY

## (undated) DEVICE — APPL CHLORAPREP W/TINT 26ML BLU

## (undated) DEVICE — SUT VIC 0 UR6 27IN VCP603H

## (undated) DEVICE — MINI ENDOCUT SCISSOR TIP, DISPOSABLE: Brand: RENEW

## (undated) DEVICE — GLV SURG GRN DERMASSURE LF PF 7.5

## (undated) DEVICE — SUT MNCRYL PLS ANTIB UD 4/0 PS2 18IN

## (undated) DEVICE — ENDOPOUCH RETRIEVER SPECIMEN RETRIEVAL BAGS: Brand: ENDOPOUCH RETRIEVER

## (undated) DEVICE — GLV SURG BIOGEL LTX PF 7

## (undated) DEVICE — ENDOPATH XCEL BLUNT TIP TROCARS WITH SMOOTH SLEEVES: Brand: ENDOPATH XCEL

## (undated) DEVICE — SUT SILK 3/0 SH 30IN K832H

## (undated) DEVICE — APPL CHLORAPREP TINTED 26ML TEAL

## (undated) DEVICE — [HIGH FLOW INSUFFLATOR,  DO NOT USE IF PACKAGE IS DAMAGED,  KEEP DRY,  KEEP AWAY FROM SUNLIGHT,  PROTECT FROM HEAT AND RADIOACTIVE SOURCES.]: Brand: PNEUMOSURE

## (undated) DEVICE — PK LAP LASR CHOLE 10

## (undated) DEVICE — APPL COTN TP PLSTC 6IN STRL LF PK/2

## (undated) DEVICE — ENDOPATH XCEL BLADELESS TROCARS WITH STABILITY SLEEVES: Brand: ENDOPATH XCEL

## (undated) DEVICE — CVR HNDL LIGHT RIGID

## (undated) DEVICE — ENDOPATH XCEL UNIVERSAL TROCAR STABLILITY SLEEVES: Brand: ENDOPATH XCEL